# Patient Record
Sex: FEMALE | Race: WHITE | NOT HISPANIC OR LATINO | Employment: STUDENT | URBAN - METROPOLITAN AREA
[De-identification: names, ages, dates, MRNs, and addresses within clinical notes are randomized per-mention and may not be internally consistent; named-entity substitution may affect disease eponyms.]

---

## 2017-04-05 NOTE — PATIENT DISCUSSION
POAG, OU: INTRAOCULAR PRESSURE IS WITHIN ACCEPTABLE LIMITS. s/p iSTENT OU AND SLT INFERIOR 180 OU. CONTINUE COMBIGAN BID OU, DISCUSSED TRIAL OFF DROPS BUT SIGNIFICANT CONSTRICTION OD&gt;OS.  PT WISHES TO STAY ON COMBIGAN BID OU

## 2017-04-05 NOTE — PATIENT DISCUSSION
Continue: Combigan (brimonidine-timolol): drops: 0.2-0.5% 1 drop twice a day as directed into both eyes 06-

## 2017-04-05 NOTE — PATIENT DISCUSSION
POAG, OU Counseling: I have reviewed the regimen of glaucoma drops with the patient and have stressed the importance of compliance.  Patient instructed to continue present medication and return for follow-up as scheduled

## 2017-04-05 NOTE — PATIENT DISCUSSION
FRANK EXACERBATED BY BLEPHARITIS, OU: PRESCRIBE WARM COMPRESSES AND EYELID SCRUBS QD-BID, ARTIFICIAL TEARS BID-QID, THE DAILY INTAKE OF OMEGA-3 FATTY ACIDS

## 2017-09-01 ENCOUNTER — ESTABLISHED COMPREHENSIVE EXAM (OUTPATIENT)
Dept: URBAN - METROPOLITAN AREA CLINIC 39 | Facility: CLINIC | Age: 27
End: 2017-09-01

## 2017-09-01 DIAGNOSIS — H04.123: ICD-10-CM

## 2017-09-01 DIAGNOSIS — H10.13: ICD-10-CM

## 2017-09-01 PROCEDURE — 92015 DETERMINE REFRACTIVE STATE: CPT

## 2017-09-01 PROCEDURE — 92014 COMPRE OPH EXAM EST PT 1/>: CPT

## 2017-09-01 ASSESSMENT — VISUAL ACUITY
OD_SC: J2
OS_SC: 20/30
OS_SC: J2
OD_SC: 20/25

## 2017-09-01 ASSESSMENT — TONOMETRY
OS_IOP_MMHG: 11
OD_IOP_MMHG: 11

## 2017-10-04 NOTE — PATIENT DISCUSSION
POAG, OU: INTRAOCULAR PRESSURE IS WITHIN ACCEPTABLE LIMITS. s/p iSTENT OU AND SLT INFERIOR 180 OU. CONTINUE COMBIGAN BID OU, DISCUSSED TRIAL OFF DROPS BUT SIGNIFICANT CONSTRICTION OD&gt;OS. PT WISHES TO STAY ON COMBIGAN BID OU.  REVIEWED VF 24-2 WITH PT.

## 2018-04-10 NOTE — PATIENT DISCUSSION
FRANK EXACERBATED BY BLEPHARITIS, OU: CONTINUE WARM COMPRESSES AND EYELID SCRUBS QD-BID, ARTIFICIAL TEARS BID-QID, THE DAILY INTAKE OF OMEGA-3 FATTY ACIDS

## 2018-10-09 NOTE — PATIENT DISCUSSION
POAG, OU: INTRAOCULAR PRESSURE IS WITHIN ACCEPTABLE LIMITS. s/p iSTENT OU AND SLT INFERIOR 180 OU. CONTINUE COMBIGAN BID OU, DISCUSSED TRIAL OFF DROPS BUT SIGNIFICANT CONSTRICTION OD&gt;OS. PT WISHES TO STAY ON COMBIGAN BID OU.  DISCUSSED VF / RNFL WITH PT

## 2018-12-12 ENCOUNTER — CONSULT (OUTPATIENT)
Dept: URBAN - METROPOLITAN AREA CLINIC 39 | Facility: CLINIC | Age: 28
End: 2018-12-12

## 2018-12-12 DIAGNOSIS — L98.8: ICD-10-CM

## 2018-12-12 PROCEDURE — 64612C BOTOX / COSMETIC

## 2019-07-30 NOTE — PATIENT DISCUSSION
POAG, OU: INTRAOCULAR PRESSURE IS WITHIN ACCEPTABLE LIMITS. s/p iSTENT OU AND SLT INFERIOR 180 OU. CONTINUE COMBIGAN BID OU,

## 2019-11-19 NOTE — PATIENT DISCUSSION
POAG, OU: INTRAOCULAR PRESSURE IS WITHIN ACCEPTABLE LIMITS. s/p iSTENT OU AND SLT INFERIOR 180 OU. CONTINUE COMBIGAN BID OU.  DISCUSSED VF WITH PT.

## 2020-03-18 NOTE — PATIENT DISCUSSION
POAG, OU: INTRAOCULAR PRESSURE IS WITHIN ACCEPTABLE LIMITS. s/p iSTENT OU AND SLT INFERIOR 180 OU. CONTINUE COMBIGAN BID OU.  DISCUSSED RNFL WITH PT.

## 2020-05-13 ENCOUNTER — ESTABLISHED COMPREHENSIVE EXAM (OUTPATIENT)
Dept: URBAN - METROPOLITAN AREA CLINIC 38 | Facility: CLINIC | Age: 30
End: 2020-05-13

## 2020-05-13 DIAGNOSIS — H52.201: ICD-10-CM

## 2020-05-13 DIAGNOSIS — H52.03: ICD-10-CM

## 2020-05-13 PROCEDURE — 92015 DETERMINE REFRACTIVE STATE: CPT

## 2020-05-13 PROCEDURE — 92014 COMPRE OPH EXAM EST PT 1/>: CPT

## 2020-05-13 ASSESSMENT — TONOMETRY
OD_IOP_MMHG: 12
OS_IOP_MMHG: 13

## 2020-05-13 ASSESSMENT — VISUAL ACUITY
OU_CC: 20/20
OD_CC: 20/25+1
OU_CC: J1+
OD_CC: J1+
OS_CC: 20/30+2
OS_CC: J1+

## 2020-07-22 NOTE — PATIENT DISCUSSION
POAG, OU: INTRAOCULAR PRESSURE IS WITHIN ACCEPTABLE LIMITS. s/p iSTENT OU AND SLT INFERIOR 180 OU. CONTINUE COMBIGAN BID OU. UPDATE DISC PHOTOS TODAY.

## 2021-08-18 NOTE — PATIENT DISCUSSION
POAG, OU: INTRAOCULAR PRESSURE IS WITHIN ACCEPTABLE LIMITS. s/p iSTENT OU AND SLT INFERIOR 180 OU. CONTINUE COMBIGAN BID OU.

## 2022-03-14 NOTE — PATIENT DISCUSSION
INTRAOCULAR PRESSURE IS WITHIN ACCEPTABLE LIMITS. s/p iSTENT OU AND SLT INFERIOR 180 OU. CONTINUE COMBIGAN BID OU.

## 2023-01-10 NOTE — PATIENT DISCUSSION
It was discussed with the patient the importance of good control of their blood sugar, blood pressure, cholesterol, diet, exercise and weight under the guidance of their diabetic doctor to prevent diabetic retinopathy.